# Patient Record
Sex: MALE | Race: WHITE
[De-identification: names, ages, dates, MRNs, and addresses within clinical notes are randomized per-mention and may not be internally consistent; named-entity substitution may affect disease eponyms.]

---

## 2018-04-11 ENCOUNTER — HOSPITAL ENCOUNTER (EMERGENCY)
Dept: HOSPITAL 24 - ER | Age: 83
Discharge: HOME | End: 2018-04-11
Payer: COMMERCIAL

## 2018-04-11 VITALS
DIASTOLIC BLOOD PRESSURE: 98 MMHG | SYSTOLIC BLOOD PRESSURE: 168 MMHG | DIASTOLIC BLOOD PRESSURE: 98 MMHG | SYSTOLIC BLOOD PRESSURE: 168 MMHG

## 2018-04-11 VITALS — BODY MASS INDEX: 29.2 KG/M2
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
## 2018-04-11 DIAGNOSIS — M54.5: Primary | ICD-10-CM

## 2018-04-11 PROCEDURE — 96372 THER/PROPH/DIAG INJ SC/IM: CPT

## 2018-04-11 PROCEDURE — 72125 CT NECK SPINE W/O DYE: CPT

## 2018-04-11 PROCEDURE — 72131 CT LUMBAR SPINE W/O DYE: CPT

## 2018-04-11 PROCEDURE — 99283 EMERGENCY DEPT VISIT LOW MDM: CPT

## 2018-04-11 PROCEDURE — 72128 CT CHEST SPINE W/O DYE: CPT

## 2018-04-11 NOTE — CT
CT LUMBAR SPINE WITHOUT



CLINICAL HISTORY: 84-year-old male status post fall from [place] with back pain.



COMPARISON: None.



TECHNIQUE:  Multiple, noncontrasted axial CT images were obtained from the thoracolumbar junction to 
the sacrum and reconstructed in the sagittal and coronal planes. 



FINDINGS: There is a normal lumbar lordosis. Acute superior endplate compression fracture superimpose
d upon large Schmorl's node L2 with approximately 50% height loss centrally and 25% height loss anter
iorly. No retropulsion of fracture fragments. Remaining vertebral body heights are preserved. The pos
terior elements are normal in appearance and alignment. Multilevel disc degeneration and spondyloarth
ropathy.     



T11-T12: Vacuum disc phenomena without central canal or neural foraminal stenosis.



T12-L1: No central canal or neural foraminal stenosis. Mild facet arthropathy.



L1-L2: Acute superior endplate compression fracture L2 without retropulsion of fracture fragments. Mi
ld-to-moderate facet arthropathy and disc bulge without central canal or neural foraminal stenosis.



L2-L3: Moderate disc bulge and facet arthropathy without central canal or neural foraminal stenosis.



L3-L4: Large disc bulge and severe facet arthropathy narrowing central canal approximately 11 mm with
out neural foraminal stenosis.



L4-L5: Large disc bulge with severe facet arthropathy narrowing canal at approximately 8.3 mm with mi
ld bilateral neural foraminal stenosis.



L5-S1: Large disc bulge with severe facet arthropathy without central canal stenosis. Mild bilateral 
neural foraminal stenosis.



Severe atherosclerotic calcification of the aorta and its branches. Incompletely evaluated, simple ap
pearing 3.5 cm left renal cyst with bilateral renal atrophy and perinephric stranding. Remaining para
spinous soft tissues are overall unremarkable.



IMPRESSION:

   

1. Superior endplate compression fracture superimposed on Schmorl's node without retropulsion of frac
ture fragments as described above.

2. Multilevel disc degeneration and spondyloarthropathy as described above.

3. Left renal cysts, incompletely evaluated, consider nonemergent outpatient ultrasound for further d
elineation.





Reported By:Electronically Signed by ADAM VILLAR MD at 4/11/2018 7:28:08 PM

## 2018-04-11 NOTE — DR.GENAD
HPI





- PCP


Primary Care Physician: EFR[facility]





- Complaint/Symptoms


Chief Complaint:: PT FELL OUT OF BATH TUB C/O LOWER BACK PAIN





- Source


History Provided: Patient





- Mode of Arrival


Mode of Arrival: Ambulatory





- Timing


Onset of Chief Complaint: 04/11/18





PMH





- PMH


Past Medical History: Yes


Past Medical History: COPD, Dementia, Hypertension


Past Surgical History: Yes


Surgical History: Bowel Resection


Past Surgical History Comment: HERNIA REPAIR





- Family History


History of Family Medical Conditions: No





- Social History


Does any household member use tobacco: No


Alcohol Use: None


Do you use any recreational Drugs:: No


Lives With: Family


Lives Where: Home





- infectious screening


In the last 2 months have you had wt loss of >10#?: NO


Have you had fever, night sweats or hemotysis?: No


Have you traveled outside the country in the last 6 months?: No


Isolation: Standard





PE





- Vital Signs


Vitals: 


 





Temperature                      99 F


Pulse Rate                       107


Respiratory Rate                 20


Blood Pressure                   201/93


O2 Sat by Pulse Oximetry         97











- General


General Appearance: Alert, In Distress (pain)





- Head


Head Exam: Normal Inspection





- Eyes


Eye exam: Normal Appearance





- ENT


ENT Exam: Normal Exam





- Neck


Neck Exam: Normal Inspection





- Chest


Chest Inspection: Normal Inspection





- Respiratory


Respiratory Exam: Normal Lung Sounds Bilat


Respiratory Exam: Bilateral Clear to Auscultation





- Cardiovascular


Cardiovascular Exam: Regular Rate, Normal Rhythm





- Abdominal Exam


Abdominal Exam: Normal Inspection, Normal Bowel Sounds, Soft





- Extremities


Extremities Exam: Normal Inspection, Full ROM





- Back


Back Exam: Tenderness (in mid thoracic area mostly in paraspinas muscles.  ).  

negative: (R) CVA Tenderness, (L) CVA Tenderness





- Neurologic


Neurological Exam: Other (pt with baseline dementia)





- Skin


Skin Exam: Warm, Dry, Other (no bruising noted)





MDM





- Additional Information


Additional Information Obtained From: Family





Course





- Treatment


Treatment: morphine





- Reevaluation


1st: Improved





- Education/Counseling


Education/Counseling: Patient, Family


Educated On: Treatment, Diagnosis, Prognosis, Needs for Follow Up





ROR





- XRAY


XRAY Findings: L2 endplate compression fracture





- Discharge Plan


Condition: Stable


Prescriptions: 


Calcitonin Nasal Spray [Miacalcin Nasal Spray] 1 sprays ONENOSTRIL DAILY #1 

bottle


Hydrocodone-Acet 7.5 mg/325 mg [Norco 7.5/325 mg Tab] 1 tab PO Q4H PRN #60 tab


 PRN Reason: Pain





- Follow ups/Referrals


Follow ups/Referrals: 


Maryellen Cornejo [Primary Care Provider] - 3 days





- Instructions


Instructions:  Spinal Compression Fracture

## 2018-04-11 NOTE — CT
CT CERVICAL SPINE WITHOUT CONTRAST



CLINICAL HISTORY: 84-year-old male status post fall from [place]ub with back pain.



COMPARISON: None.



TECHNIQUE:  Multiple, noncontrasted axial CT images were obtained from the skull base to the cervical
-thoracic junction and reformatted in the sagittal and coronal planes.



FINDINGS: Straightening of the cervical lordosis as imaged. Multilevel disc degeneration and spondylo
arthropathy without fracture or malalignment. The atlanto-axial and atlanto-occipital relationships a
re preserved. The posterior elements are normal in appearance and alignment. 1.1 cm low-attenuation n
odule within the right thyroid. Atherosclerotic calcification of the carotid bulbs bilaterally. Soft 
tissues of the neck otherwise unremarkable. Scattered paraseptal and centrilobular emphysematous armando
ge of the lung apices. Significant atherosclerotic calcification of the aortic arch and great vessels
.



At C2-C3: Mild loss of disc space without significant central canal or neural foraminal stenosis. Mod
erate left facet arthropathy.



At C3-C4: Mild loss of disc space with facet arthropathy left greater than right and bilateral uncove
rtebral joint hypertrophy with spurring producing mild bilateral neural foraminal stenosis. Disc oste
ophyte narrows canal 9.7 mm.



At C4-C5: Near-complete loss of disc space with broad-based disc osteophyte narrowing canal to approx
imately 11.5 mm. Uncovertebral and facet joint hypertrophy combine to produce mild bilateral neural f
oraminal stenosis.



At C5-C6: Near-complete loss of disc space with broad-based disc osteophyte narrowing canal approxima
tely 10.5 mm. Uncovertebral joint hypertrophy with spurring produces moderate right and mild left layton
ral foraminal stenosis.



At C6-C7: No central canal or neural foraminal stenosis.



At C7-T1: No central canal or neural foraminal stenosis.









IMPRESSION:



1. No evidence of acute fracture or malalignment.

2. Multilevel disc degeneration and spondyloarthropathy as described.

3. Right thyroid nodule, further evaluation with serology and ultrasound on a nonemergent outpatient 
basis is recommended if not previously performed.





Reported By:Electronically Signed by ADAM VILLAR MD at 4/11/2018 7:22:08 PM

## 2018-04-11 NOTE — CT
HISTORY: Back pain status post fall.



Study: CT thoracic spine without contrast



Comparison: None.



Technique: Multiple axial images of the thoracic spine were obtained from the thoracic inlet to the t
horacolumbar junction.  Sagittal and coronal reconstructions were performed and reviewed.  Dose reduc
tion techniques including Automated Exposure Control (AEC) and adjustment of mA and kV were utilized.




Findings:



Compression fracture of the L2 vertebral body that appears remote. No other obvious acute fracture or
 listhesis. Multilevel moderate to severe degenerative changes. No significant neural foraminal narro
wing or spinal canal stenosis. Mild/moderate centrilobular and paraseptal emphysematous changes. 4 mm
 pulmonary nodule along the right major fissure (series 3, image 41). No other obvious pulmonary nodu
les or masses are seen. Partially visualized 3.1 cm left interpolar simple appearing cyst. Thoracic a
lagn and coronary artery calcifications. Remaining soft tissue structures are unremarkable.



IMPRESSION:

 

1.  Likely remote compression fracture of the L2 vertebral body. No other obvious acute fracture or l
isthesis.



2. 4 mm pulmonary nodule within the right lung as above. Recommend dedicated CT of the chest on outpa
tient basis for further characterization.





Reported By:Electronically Signed by KYRIE POLK MD at 4/11/2018 7:29:08 PM

## 2018-04-19 ENCOUNTER — HOSPITAL ENCOUNTER (OUTPATIENT)
Dept: HOSPITAL 24 - RAD | Age: 83
End: 2018-04-19
Attending: INTERNAL MEDICINE
Payer: COMMERCIAL

## 2018-04-19 DIAGNOSIS — R91.1: Primary | ICD-10-CM

## 2018-04-19 LAB
BUN SERPL-MCNC: 18 MG/DL (ref 7–18)
CREAT SERPL-MCNC: 0.96 MG/DL (ref 0.7–1.3)

## 2018-04-19 PROCEDURE — 71260 CT THORAX DX C+: CPT

## 2018-04-19 PROCEDURE — 82565 ASSAY OF CREATININE: CPT

## 2018-04-19 PROCEDURE — 36415 COLL VENOUS BLD VENIPUNCTURE: CPT

## 2018-04-19 PROCEDURE — 84520 ASSAY OF UREA NITROGEN: CPT

## 2018-04-19 NOTE — CT
HISTORY: Lung nodule seen on recent CT



Study: CT chest with contrast



Comparison: April 11, 2018



Technique: Multiple axial images of the chest were obtained from the thoracic inlet to the upper abdo
men after the administration of IV contrast.  AEC was utilized.



Findings:



There is an incidental sub cm right thyroid nodule again noted.  There is no pericardial effusion obs
erved.  The thoracic aorta is normal in its contour without evidence for aneurysmal dilatation or dis
section.  The central pulmonary arterial system does not demonstrate central filling defects to sugge
st pulmonary emboli. Coronary atherosclerosis is noted. Evaluation of the lung parenchyma demonstrate
s a background of centrilobular and paraseptal emphysema with an apical predominance.  There is a sta
ble 4 mm right upper lobe pulmonary nodule along the major fissure with several additional equivocal 
2-3 mm nodules in the right lower lobe versus vascular ectasia. The larger nodule demonstrates a semi
-solid appearance. Six-month follow-up CT chest without contrast is recommended based on Fleischner s
ociety criteria.  There is no pathologic hilar or mediastinal lymphadenopathy.  There is no effusion 
or pneumothorax.  No destructive osseous lesions are seen.  radiograph demonstrates a subacute t
o chronic L2 compression fracture as previously described. Thoracic spondylosis and findings of DISH 
are noted. A simple left renal cyst is noted. A tiny calcified granuloma is noted within the liver.



IMPRESSION:

 

Background of emphysema with right upper and lower lobe pulmonary nodules as above for which six-bill
h follow-up is recommended.



Reported By:Electronically Signed by VALE SINCLAIR MD at 4/19/2018 11:23:04 AM